# Patient Record
Sex: MALE | Race: WHITE | URBAN - METROPOLITAN AREA
[De-identification: names, ages, dates, MRNs, and addresses within clinical notes are randomized per-mention and may not be internally consistent; named-entity substitution may affect disease eponyms.]

---

## 2017-03-14 ENCOUNTER — OFFICE VISIT (OUTPATIENT)
Dept: CARDIOLOGY CLINIC | Age: 52
End: 2017-03-14

## 2017-03-14 VITALS
HEIGHT: 73 IN | SYSTOLIC BLOOD PRESSURE: 140 MMHG | WEIGHT: 183 LBS | DIASTOLIC BLOOD PRESSURE: 80 MMHG | OXYGEN SATURATION: 98 % | HEART RATE: 71 BPM | BODY MASS INDEX: 24.25 KG/M2 | RESPIRATION RATE: 16 BRPM

## 2017-03-14 DIAGNOSIS — Z82.49 FAMILY HISTORY OF EARLY CAD: ICD-10-CM

## 2017-03-14 DIAGNOSIS — Z00.00 ROUTINE HEALTH MAINTENANCE: ICD-10-CM

## 2017-03-14 DIAGNOSIS — R07.9 EXERTIONAL CHEST PAIN: Primary | ICD-10-CM

## 2017-03-14 DIAGNOSIS — R06.02 SOB (SHORTNESS OF BREATH) ON EXERTION: ICD-10-CM

## 2017-03-14 NOTE — PROGRESS NOTES
Tamy Kirkland     1965       Stephane Roque MD, [facility]  Date of Visit-3/14/2017   PCP is No primary care provider on file. [facility] [place]  [facility]  HPI:  Tamy Kirkland is a 45 y.o. male   Patient is self referred for cardiology evaluation, has family history. He sees no provider regularly. He says that about a year ago he started to develop a chest pain if he sprints 100 yards. If he walks, goes to the gym or does normal activity it is not there, but if he does the heavy exertion then he does get pain in the chest.  He denies otherwise feeling bad, but he is anxious and concerned. He has a bump on his neck which he says is a sebaceous cyst he's had for 20 years. Social History:  Works as an analyst and part time . Is a nonsmoker. . Family History:  Father had an MI at 46,  of MI at 78, brother had MI at 46, had a bypass at 58. ROS:  He declined to fill out the new patient worksheet, including ROS, but it is reviewed with him. EK17 - showed sinus rhythm, within normal limits. Assessment/Plan:     1. Patient with cardiovascular risk factors and exertional chest pain, possible dyspnea on exertion. Will plan a stress test to try to maximize him at peak exercise to see if he gets this repetitive exertional pain and will get a coronary calcium score along with fasting lipids. He has no PCP. Will get a BMP, CBC and TSH. Follow up results of testing. See back in one year if testing is normal.   Impression:   1. Exertional chest pain    2. SOB (shortness of breath) on exertion    3. Routine health maintenance    4. Family history of early CAD       Exam and Labs:    Visit Vitals    /80 (BP 1 Location: Left arm, BP Patient Position: Sitting)    Pulse 71    Resp 16    Ht 6' 1\" (1.854 m)    Wt 183 lb (83 kg)    SpO2 98%    BMI 24.14 kg/m2      Constitutional:  NAD, comfortable  Head: NC,AT.  Eyes: No scleral icterus. Neck:  Neck supple. No JVD present. Throat: moist mucous membranes. Chest: Effort normal & normal respiratory excursion . Neurological: alert, conversant and oriented . Skin: Skin is not cold. No obvious systemic rash noted. Not diaphoretic. No erythema. Psychiatric:  Grossly normal mood and affect. Behavior appears normal. Extremities:  no clubbing or cyanosis. Abdomen: non distended    Lungs:breath sounds normal. No stridor. distress, wheezes or  Rales. Heart:    normal rate, regular rhythm, normal S1, S2, no murmurs, rubs, clicks or gallops , PMI non displaced. Edema: Edema is none. No current outpatient prescriptions on file. No current facility-administered medications for this visit. Impression see above.

## 2017-03-14 NOTE — PATIENT INSTRUCTIONS
Have labs drawn today    Obtain a coronary artery calcium score    Schedule an exercise nuclear stress test    We will call you with all results    Follow up with Dr. Rashad Galeano in 1 year

## 2017-03-14 NOTE — MR AVS SNAPSHOT
Visit Information Date & Time Provider Department Dept. Phone Encounter #  
 3/14/2017 10:00 AM Romana Drew, MD [facility] [phone] [id number] Allergies as of 3/14/2017  Review Complete On: 3/14/2017 By: Marsha Nur No Known Allergies Current Immunizations  Never Reviewed No immunizations on file. Not reviewed this visit You Were Diagnosed With   
  
 Codes Comments SOB (shortness of breath) on exertion    -  Primary ICD-10-CM: R06.02 
ICD-9-CM: 786.05   
 GAUTAM (dyspnea on exertion)     ICD-10-CM: R06.09 
ICD-9-CM: 786.09 Other chest pain     ICD-10-CM: R07.89 ICD-9-CM: 786.59 Routine health maintenance     ICD-10-CM: Z00.00 ICD-9-CM: V70.0 Vitals BP Pulse Resp Height(growth percentile) Weight(growth percentile) SpO2  
 140/80 (BP 1 Location: Left arm, BP Patient Position: Sitting) 71 16 6' 1\" (1.854 m) 183 lb (83 kg) 98% BMI Smoking Status 24.14 kg/m2 Former Smoker Vitals History BMI and BSA Data Body Mass Index Body Surface Area  
 24.14 kg/m 2 2.07 m 2 Your Updated Medication List  
  
Notice  As of 3/14/2017 10:52 AM  
 You have not been prescribed any medications. We Performed the Following AMB POC EKG ROUTINE W/ 12 LEADS, INTER & REP [66913 CPT(R)] CBC W/O DIFF [66699 CPT(R)] LIPID PANEL [59293 CPT(R)] METABOLIC PANEL, BASIC [94839 CPT(R)] TSH 3RD GENERATION [40565 CPT(R)] To-Do List   
 03/14/2017 ECG:  STRESS TEST CARDIOLITE Patient Instructions Have labs drawn today Obtain a coronary artery calcium score Schedule an exercise nuclear stress test 
 
We will call you with all results Follow up with Dr. Kathy Hill in 1 year Introducing [facility]!    
 Kim Alba introduces [employer] patient portal. Now you can access parts of your medical record, email your doctor's office, and request medication refills online. 1. In your internet browser, go to [email or web address] 2. Click on the First Time User? Click Here link in the Sign In box. You will see the New Member Sign Up page. 3. Enter your Z[employer]ora Access Code exactly as it appears below. You will not need to use this code after youve completed the sign-up process. If you do not sign up before the expiration date, you must request a new code. · [employer] Access Code: [email or web address] Expires: 6/12/2017 10:15 AM 
 
4. Enter the last four digits of your Social Security Number (xxxx) and Date of Birth (mm/dd/yyyy) as indicated and click Submit. You will be taken to the next sign-up page. 5. Create a Zuora ID. This will be your Zuora login ID and cannot be changed, so think of one that is secure and easy to remember. 6. Create a Zuora password. You can change your password at any time. 7. Enter your Password Reset Question and Answer. This can be used at a later time if you forget your password. 8. Enter your e-mail address. You will receive e-mail notification when new information is available in [address] 9. Click Sign Up. You can now view and download portions of your medical record. 10. Click the Download Summary menu link to download a portable copy of your medical information. If you have questions, please visit the Frequently Asked Questions section of the [employer] website. Remember, [employer] is NOT to be used for urgent needs. For medical emergencies, dial 911. Now available from your iPhone and Android! Please provide this summary of care documentation to your next provider. If you have any questions after today's visit, please call [phone].

## 2017-03-15 ENCOUNTER — TELEPHONE (OUTPATIENT)
Dept: CARDIOLOGY CLINIC | Age: 52
End: 2017-03-15

## 2017-03-15 LAB
BUN SERPL-MCNC: 14 MG/DL (ref 6–24)
BUN/CREAT SERPL: 16 (ref 9–20)
CALCIUM SERPL-MCNC: 9.4 MG/DL (ref 8.7–10.2)
CHLORIDE SERPL-SCNC: 99 MMOL/L (ref 96–106)
CHOLEST SERPL-MCNC: 174 MG/DL (ref 100–199)
CO2 SERPL-SCNC: 26 MMOL/L (ref 18–29)
CREAT SERPL-MCNC: 0.86 MG/DL (ref 0.76–1.27)
ERYTHROCYTE [DISTWIDTH] IN BLOOD BY AUTOMATED COUNT: 13.4 % (ref 12.3–15.4)
GLUCOSE SERPL-MCNC: 83 MG/DL (ref 65–99)
HCT VFR BLD AUTO: 44 % (ref 37.5–51)
HDLC SERPL-MCNC: 62 MG/DL
HGB BLD-MCNC: 15.1 G/DL (ref 12.6–17.7)
INTERPRETATION, 910389: NORMAL
LDLC SERPL CALC-MCNC: 99 MG/DL (ref 0–99)
MCH RBC QN AUTO: 29.4 PG (ref 26.6–33)
MCHC RBC AUTO-ENTMCNC: 34.3 G/DL (ref 31.5–35.7)
MCV RBC AUTO: 86 FL (ref 79–97)
PLATELET # BLD AUTO: 213 X10E3/UL (ref 150–379)
POTASSIUM SERPL-SCNC: 4.4 MMOL/L (ref 3.5–5.2)
RBC # BLD AUTO: 5.13 X10E6/UL (ref 4.14–5.8)
SODIUM SERPL-SCNC: 142 MMOL/L (ref 134–144)
TRIGL SERPL-MCNC: 65 MG/DL (ref 0–149)
TSH SERPL DL<=0.005 MIU/L-ACNC: 1.22 UIU/ML (ref 0.45–4.5)
VLDLC SERPL CALC-MCNC: 13 MG/DL (ref 5–40)
WBC # BLD AUTO: 6.3 X10E3/UL (ref 3.4–10.8)

## 2017-03-15 NOTE — PROGRESS NOTES
Labs look good  Cholesterol is fine  Let him know  Keep same plans  3/24/2017  9:00 AM    KING ROONEY  3/20/2018  9:40 AM    Guera Golden MD        [place] [id number]

## 2017-03-15 NOTE — TELEPHONE ENCOUNTER
----- Message from Jc Robertson MD sent at 3/15/2017 11:22 AM EDT -----  Labs look good  Cholesterol is fine  Let him know  Keep same plans  3/24/2017  9:00 AM    NEVIN, KING RAND  3/20/2018  9:40 AM    Jc Robertson MD        [place] [address]

## 2017-03-15 NOTE — TELEPHONE ENCOUNTER
Patient identified times 2. Informed of results and recommendations as listed. Understanding verbalized.

## 2017-03-24 ENCOUNTER — TELEPHONE (OUTPATIENT)
Dept: CARDIOLOGY CLINIC | Age: 52
End: 2017-03-24

## 2017-03-24 ENCOUNTER — CLINICAL SUPPORT (OUTPATIENT)
Dept: CARDIOLOGY CLINIC | Age: 52
End: 2017-03-24

## 2017-03-24 DIAGNOSIS — Z82.49 FAMILY HISTORY OF MI (MYOCARDIAL INFARCTION): ICD-10-CM

## 2017-03-24 DIAGNOSIS — Z82.49 FAMILY HISTORY OF EARLY CAD: ICD-10-CM

## 2017-03-24 DIAGNOSIS — R07.9 EXERTIONAL CHEST PAIN: ICD-10-CM

## 2017-03-24 DIAGNOSIS — R07.9 CHEST PAIN, UNSPECIFIED TYPE: ICD-10-CM

## 2017-03-24 DIAGNOSIS — R06.02 SOB (SHORTNESS OF BREATH) ON EXERTION: ICD-10-CM

## 2017-03-24 NOTE — PROGRESS NOTES
See scanned document.       Ordering Doctor:Dr. Britney Ontiveros  Reading Doctor:Dr. Britney Ontiveros

## 2017-03-27 NOTE — TELEPHONE ENCOUNTER
Labs and stress test all normal  Stress test did well on time WNL EF and blood flow is WNL  Future Appointments  Date Time Provider Department Center   3/20/2018 9:40 AM Elizabeth Snyder  [address]    keep fu  Up to him if he wants [facility]

## 2018-03-20 ENCOUNTER — OFFICE VISIT (OUTPATIENT)
Dept: CARDIOLOGY CLINIC | Age: 53
End: 2018-03-20

## 2018-03-20 VITALS
HEIGHT: 73 IN | BODY MASS INDEX: 23.51 KG/M2 | SYSTOLIC BLOOD PRESSURE: 120 MMHG | HEART RATE: 59 BPM | RESPIRATION RATE: 12 BRPM | OXYGEN SATURATION: 98 % | DIASTOLIC BLOOD PRESSURE: 86 MMHG | WEIGHT: 177.4 LBS

## 2018-03-20 DIAGNOSIS — Z82.49 FAMILY HISTORY OF EARLY CAD: Primary | ICD-10-CM

## 2018-03-20 RX ORDER — IBUPROFEN 200 MG
600 TABLET ORAL
COMMUNITY

## 2018-03-20 NOTE — PATIENT INSTRUCTIONS
You will need to follow up in clinic with Dr. Chidi Prince in 1 year. Please get blood work complete today. Dr. Chidi Prince is recommending a coronary Ca+ Score. Information will be given for you to call and set this up.

## 2018-03-20 NOTE — PROGRESS NOTES
Chief Complaint   Patient presents with    Other     yearly follow up. Denies chest pain/dizziness/swelling. Shortness of breath with exertion.

## 2018-03-20 NOTE — MR AVS SNAPSHOT
[address] 
[phone] Patient: Kaleigh Traylor MRN: [id number] :1965 Visit Information Date & Time Provider Department Dept. Phone Encounter #  
 3/20/2018  9:40 AM Alexis John MD [facility] [phone] [id number] Upcoming Health Maintenance Date Due Hepatitis C Screening 1965 DTaP/Tdap/Td series (1 - Tdap) 1985 FOBT Q 1 YEAR AGE 50-75 2014 Influenza Age 5 to Adult 2017 Allergies as of 3/20/2018  Review Complete On: 3/20/2018 By: Albina Gomez RN No Known Allergies Current Immunizations  Never Reviewed No immunizations on file. Not reviewed this visit You Were Diagnosed With   
  
 Codes Comments Shortness of breath    -  Primary ICD-10-CM: R06.02 
ICD-9-CM: 786.05 Dyslipidemia     ICD-10-CM: E78.5 ICD-9-CM: 272.4 Vitals BP Pulse Resp Height(growth percentile) Weight(growth percentile) SpO2  
 120/86 (BP 1 Location: Right arm, BP Patient Position: Sitting) (!) 59 12 6' 1\" (1.854 m) 177 lb 6.4 oz (80.5 kg) 98% BMI Smoking Status 23.41 kg/m2 Former Smoker BMI and BSA Data Body Mass Index Body Surface Area  
 23.41 kg/m 2 2.04 m 2 Preferred Pharmacy Pharmacy Name Phone [address] [facility] [address] [place] [address]. [phone] Your Updated Medication List  
  
   
This list is accurate as of 3/20/18 10:35 AM.  Always use your most recent med list.  
  
  
  
  
 ibuprofen 200 mg tablet Commonly known as:  MOTRIN Take 600 mg by mouth every six (6) hours as needed for Pain. We Performed the Following AMB POC EKG ROUTINE W/ 12 LEADS, INTER & REP [76179 CPT(R)] LIPID PANEL [90771 CPT(R)] Patient Instructions You will need to follow up in clinic with Dr. Angelica Thompson in 1 year. Please get blood work complete today. Dr. Angelica Thompson is recommending a coronary Ca+ Score. Information will be given for you to call and set this up. Introducing [facility]! Ferdinand Du introduces [employer] patient portal. Now you can access parts of your medical record, email your doctor's office, and request medication refills online. 1. In your internet browser, go to [email or web address] 2. Click on the First Time User? Click Here link in the Sign In box. You will see the New Member Sign Up page. 3. Enter your [employer] Access Code exactly as it appears below. You will not need to use this code after youve completed the sign-up process. If you do not sign up before the expiration date, you must request a new code. · [employer] Access Code: [email or web address] Expires: 6/4/2018  3:47 PM 
 
4. Enter the last four digits of your Social Security Number (xxxx) and Date of Birth (mm/dd/yyyy) as indicated and click Submit. You will be taken to the next sign-up page. 5. Create a [employer] ID. This will be your [employer] login ID and cannot be changed, so think of one that is secure and easy to remember. 6. Create a [employer] password. You can change your password at any time. 7. Enter your Password Reset Question and Answer. This can be used at a later time if you forget your password. 8. Enter your e-mail address. You will receive e-mail notification when new information is available in [address] 9. Click Sign Up. You can now view and download portions of your medical record. 10. Click the Download Summary menu link to download a portable copy of your medical information. If you have questions, please visit the Frequently Asked Questions section of the [employer] website. Remember, [employer] is NOT to be used for urgent needs. For medical emergencies, dial 911. Now available from your iPhone and Android! Please provide this summary of care documentation to your next provider. If you have any questions after today's visit, please call [phone].

## 2018-03-20 NOTE — PROGRESS NOTES
Savannah Quach     1965       Stephane Almazan MD, [facility] [place]  Date of Visit-3/20/2018   PCP is No primary care provider on file. [facility] [place]  [facility]  HPI:  Savannah Quach is a 47 y.o. male   Patient was seen one year ago and  self referred for cardiology evaluation, has family history. He sees no provider regularly. He had chest pain and underwent labs and stress test all were normal  His LDL was 99,  He has a bump on his neck which he says is a sebaceous cyst he's had for 20 years. Today returns, active, Denies chest pain, edema, syncope or shortness of breath at rest   Has no tachycardia , palpitations or sense of arrythmia    Family History:  Father had an MI at 46,  of MI at 78, brother had MI at 46, had a bypass at 58. Assessment/Plan:     Patient with cardiovascular risk factors   ---resolved chest pain, normal stress test   LDL 99 intermediate , will repeat lipids and get Coronary Calcium heart CT scan  If high then statin, if similar LDL and low Agatson score then no changes  Fu one year  Call with test results   Impression:   1. 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 . Exam and Labs:    Visit Vitals    /86 (BP 1 Location: Right arm, BP Patient Position: Sitting)    Pulse (!) 59    Resp 12    Ht 6' 1\" (1.854 m)    Wt 177 lb 6.4 oz (80.5 kg)    SpO2 98%    BMI 23.41 kg/m2      Constitutional:  NAD, comfortable  Head: NC,AT. Eyes: No scleral icterus. Neck:  Neck supple. No JVD present. Throat: moist mucous membranes. Chest: Effort normal & normal respiratory excursion . Neurological: alert, conversant and oriented . Skin: Skin is not cold. No obvious systemic rash noted. Not diaphoretic. No erythema. Psychiatric:  Grossly normal mood and affect. Behavior appears normal. Extremities:  no clubbing or cyanosis.  Abdomen: non distended    Lungs:breath sounds normal. No stridor. distress, wheezes or  Rales. Heart:    normal rate, regular rhythm, normal S1, S2, no murmurs, rubs, clicks or gallops , PMI non displaced. Edema: Edema is none. Current Outpatient Prescriptions   Medication Sig    ibuprofen (MOTRIN) 200 mg tablet Take 600 mg by mouth every six (6) hours as needed for Pain. No current facility-administered medications for this visit. Impression see above.

## 2019-05-07 ENCOUNTER — OFFICE VISIT (OUTPATIENT)
Dept: CARDIOLOGY CLINIC | Age: 54
End: 2019-05-07

## 2019-05-07 VITALS
WEIGHT: 182 LBS | BODY MASS INDEX: 24.12 KG/M2 | RESPIRATION RATE: 16 BRPM | HEART RATE: 60 BPM | HEIGHT: 73 IN | SYSTOLIC BLOOD PRESSURE: 120 MMHG | OXYGEN SATURATION: 99 % | DIASTOLIC BLOOD PRESSURE: 60 MMHG

## 2019-05-07 DIAGNOSIS — Z82.49 FAMILY HISTORY OF EARLY CAD: Primary | ICD-10-CM

## 2019-05-07 NOTE — PROGRESS NOTES
Visit Vitals /60 (BP 1 Location: Left arm, BP Patient Position: Sitting) Pulse 60 Resp 16 Ht 6' 1\" (1.854 m) Wt 182 lb (82.6 kg) SpO2 99% BMI 24.01 kg/m²

## 2019-05-07 NOTE — PROGRESS NOTES
Rashmi Domingo     1965       Stephane Hill MD, [facility] [place]  Date of Visit-5/7/2019   PCP is No primary care provider on file. [facility] [place]  [facility]  HPI:  Rashmi Domingo is a 53 y.o. male   Pt is coming in for annual follow up for cardiac risk factors and family hx of cad. He has had a prior normal stress test. Patient was seen one year ago and  self referred for cardiology evaluation, has family history    Today Mr. Harini Roman reports that he is feeling \"pretty good. \" He denies partaking in consistent exercise. 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 Has some SOB with playing basketball, but this has been the case for a while now. Denies chest pain, edema, syncope or shortness of breath at rest, has no tachycardia, palpitations or sense of arrhythmia. EKG: sinus with an incomplete right bundle branch block    Assessment/Plan:     Cardiovascular risk factors: asymptomatic. Doing well. Has strong family hx. We are going to repeat his lipids and then decide if he needs a coronary calcium score. He was intermediate on his last lipids. F/U in one year  Future Appointments   Date Time Provider Baldomero Marshi   5/11/2020  9:40 AM Bárbara Bradley  [address]      Patient Instructions   Obtain lipid panel today. Brochure for coronary calcium score given to patient. Follow up with  in 1 Year. Key CAD CHF Meds     Patient is on no cardiovascular meds. Impression:   1. Family history of early CAD       Cardiac History:   No specialty comments available. ROS-except as noted above. . A complete cardiac and respiratory are reviewed and negative except as above ; Resp-denies wheezing  or productive cough,.  Const- No unusual weight loss or fever; Neuro-no recent seizure or CVA ; GI- No BRBPR, abdom pain, bloating ; - no  hematuria   see supplement sheet, initialed and to be scanned by staff  Past Medical History:   Diagnosis Date    Family history of early CAD 3/20/2018      Social Hx= reports that he has quit smoking. He has never used smokeless tobacco. He reports that he drinks alcohol. He reports that he does not use drugs. Exam and Labs:  /60 (BP 1 Location: Left arm, BP Patient Position: Sitting)   Pulse 60   Resp 16   Ht 6' 1\" (1.854 m)   Wt 182 lb (82.6 kg)   SpO2 99%   BMI 24.01 kg/m² Constitutional:  NAD, comfortable  Head: NC,AT. Eyes: No scleral icterus. Neck:  Neck supple. No JVD present. Throat: moist mucous membranes. Chest: Effort normal & normal respiratory excursion . Neurological: alert, conversant and oriented . Skin: Skin is not cold. No obvious systemic rash noted. Not diaphoretic. No erythema. Psychiatric:  Grossly normal mood and affect. Behavior appears normal. Extremities:  no clubbing or cyanosis. Abdomen: non distended    Lungs:breath sounds normal. No stridor. distress, wheezes or  Rales. Heart: normal rate, regular rhythm, normal S1, S2, no murmurs, rubs, clicks or gallops , PMI non displaced. Edema: Edema is none.   Lab Results   Component Value Date/Time    Cholesterol, total 174 03/14/2017 11:12 AM    HDL Cholesterol 62 03/14/2017 11:12 AM    LDL, calculated 99 03/14/2017 11:12 AM    Triglyceride 65 03/14/2017 11:12 AM     Lab Results   Component Value Date/Time    Sodium 142 03/14/2017 11:12 AM    Potassium 4.4 03/14/2017 11:12 AM    Chloride 99 03/14/2017 11:12 AM    CO2 26 03/14/2017 11:12 AM    Glucose 83 03/14/2017 11:12 AM    BUN 14 03/14/2017 11:12 AM    Creatinine 0.86 03/14/2017 11:12 AM    BUN/Creatinine ratio 16 03/14/2017 11:12 AM    GFR est  03/14/2017 11:12 AM    GFR est non- 03/14/2017 11:12 AM    Calcium 9.4 03/14/2017 11:12 AM      Wt Readings from Last 3 Encounters:   05/07/19 182 lb (82.6 kg)   03/20/18 177 lb 6.4 oz (80.5 kg)   03/14/17 183 lb (83 kg)      BP Readings from Last 3 Encounters:   05/07/19 120/60   03/20/18 120/86   03/14/17 140/80      Current Outpatient Medications   Medication Sig    ibuprofen (MOTRIN) 200 mg tablet Take 600 mg by mouth every six (6) hours as needed for Pain. No current facility-administered medications for this visit. Impression see above.       Written by Court Lozano, as dictated by Jonathan Mart MD.

## 2019-05-07 NOTE — PATIENT INSTRUCTIONS
Obtain lipid panel today. Brochure for coronary calcium score given to patient. Follow up with  in 1 Year.

## 2019-05-09 ENCOUNTER — TELEPHONE (OUTPATIENT)
Dept: CARDIOLOGY CLINIC | Age: 54
End: 2019-05-09

## 2019-05-09 LAB
CHOLEST SERPL-MCNC: 184 MG/DL (ref 100–199)
HDLC SERPL-MCNC: 53 MG/DL
INTERPRETATION, 910389: NORMAL
LDLC SERPL CALC-MCNC: 116 MG/DL (ref 0–99)
TRIGL SERPL-MCNC: 73 MG/DL (ref 0–149)
VLDLC SERPL CALC-MCNC: 15 MG/DL (ref 5–40)

## 2019-05-09 NOTE — TELEPHONE ENCOUNTER
----- Message from Robert Morfin MD sent at 5/9/2019 11:04 AM EDT -----  Again with an intermediate LDL   Not high enough for meds, but now low either  Suggest he get the Coronary Calcium score as we planned and if score low then no statin, if elevated then statin

## 2019-05-09 NOTE — TELEPHONE ENCOUNTER
Identifiers x 2. Informed patient of Dr. Bharti Garcia recommendation to get coronary calcium score. Verbalized understanding.

## 2019-05-09 NOTE — PROGRESS NOTES
Again with an intermediate LDL   Not high enough for meds, but now low either  Suggest he get the Coronary Calcium score as we planned and if score low then no statin, if elevated then statin

## 2022-03-19 PROBLEM — Z82.49 FAMILY HISTORY OF EARLY CAD: Status: ACTIVE | Noted: 2018-03-20

## 2022-11-11 ENCOUNTER — TELEPHONE (OUTPATIENT)
Dept: CARDIOLOGY CLINIC | Age: 57
End: 2022-11-11

## 2022-11-11 DIAGNOSIS — Z82.49 FAMILY HISTORY OF EARLY CAD: Primary | ICD-10-CM

## 2022-11-11 NOTE — TELEPHONE ENCOUNTER
Pt has not been scheduled with  since 2020. Pt would like to schedule an appointment, but would like to know If he can get a calcium scoring before his appointment. Pt would like to speak with 's  Nelly SOTO      Pt #    [phone]

## 2022-11-15 NOTE — TELEPHONE ENCOUNTER
Verified patient with two types of identifiers. Advised patient CCS is $130 out of pocket and he should call [phone] to schedule it. Scheduled follow up as well.      Future Appointments   Date Time Provider Baldomero Whaley   12/19/2022  3:20 PM MD MARKIE Sena AMB

## 2022-11-30 ENCOUNTER — HOSPITAL ENCOUNTER (OUTPATIENT)
Dept: CT IMAGING | Age: 57
Discharge: HOME | End: 2022-11-30
Attending: SPECIALIST
Payer: SELF-PAY

## 2022-11-30 DIAGNOSIS — Z82.49 FAMILY HISTORY OF EARLY CAD: ICD-10-CM

## 2022-11-30 PROCEDURE — 75571 CT HRT W/O DYE W/CA TEST: CPT

## 2022-12-20 ENCOUNTER — OFFICE VISIT (OUTPATIENT)
Dept: CARDIOLOGY CLINIC | Age: 57
End: 2022-12-20
Payer: COMMERCIAL

## 2022-12-20 VITALS
HEART RATE: 81 BPM | RESPIRATION RATE: 16 BRPM | WEIGHT: 183 LBS | HEIGHT: 73 IN | OXYGEN SATURATION: 99 % | BODY MASS INDEX: 24.25 KG/M2 | SYSTOLIC BLOOD PRESSURE: 130 MMHG | DIASTOLIC BLOOD PRESSURE: 80 MMHG

## 2022-12-20 DIAGNOSIS — I25.10 CORONARY ARTERY ATHEROMA: Primary | ICD-10-CM

## 2022-12-20 DIAGNOSIS — Z82.49 FAMILY HISTORY OF EARLY CAD: ICD-10-CM

## 2022-12-20 DIAGNOSIS — R93.1 AGATSTON CAC SCORE, >400: ICD-10-CM

## 2022-12-20 PROCEDURE — 99204 OFFICE O/P NEW MOD 45 MIN: CPT | Performed by: SPECIALIST

## 2022-12-20 PROCEDURE — 93000 ELECTROCARDIOGRAM COMPLETE: CPT | Performed by: SPECIALIST

## 2022-12-20 RX ORDER — ATORVASTATIN CALCIUM 20 MG/1
20 TABLET, FILM COATED ORAL
Qty: 90 TABLET | Refills: 3 | Status: SHIPPED | OUTPATIENT
Start: 2022-12-20

## 2022-12-20 RX ORDER — GUAIFENESIN 100 MG/5ML
81 LIQUID (ML) ORAL DAILY
Qty: 90 TABLET | Refills: 3
Start: 2022-12-20

## 2022-12-20 NOTE — PATIENT INSTRUCTIONS
Start baby Aspirin 81mg daily OTC and Lipitor 20mg daily. You have been scheduled for an exercise nuclear stress test and an echo. We will see you back after testing. Please arrive 15 minutes prior to your appointment. Wear comfortable clothing and walking or athletic shoes. Do not eat or drink anything, except water, for at least 4 hours prior to your appointment. Avoid tobacco products for at least 12 hours prior to your test.    Do not eat or drink anything containing caffeine, including but not limited to the following: chocolate, regular and decaffeinated coffee, soft drinks, or tea for at least 24 hours prior to your test.    Do not hold your scheduled medications prior to your test.    Your test will be performed on a 1 day protocol. This is determined by your height, weight, and other risk factors. For a 1 day test, please allow for 4 hours in the office that day.

## 2022-12-20 NOTE — PROGRESS NOTES
Cornel Samuels     1965       Stephane Hayes MD, [facility]  Date of Visit-12/20/2022   PCP is None   [facility] [place]  [facility]  HPI:  Cornel Samuels is a 61 y.o. male   Last seen 5/2019 for self referred for cardiology evaluation, has family history  cardiac risk factors and family hx of cad. He has had a prior normal stress test.     Today   CT cors with coronary Ca score at 659 at 90th percentile  EKG: sinus with an incomplete right bundle branch block  On exertion but it has to be at 5 flights stairs. He has no edema or chest pain. He gets occasional numbness in his extremities including left side. Assessment/Plan:     The primary encounter diagnosis was Coronary artery atheroma. Diagnoses of Agatston CAC score, >400 and Family history of early CAD were also pertinent to this visit. Markedly elevated coronary calcium score. Despite prior normal lipids given the score and his family history strong suggested high potency statin and get at least noninvasive testing. Schedule for exercise nuclear stress. Goal LDL <70-85    Patient Instructions   Start baby Aspirin 81mg daily OTC and Lipitor 20mg daily. You have been scheduled for an exercise nuclear stress test and an echo. We will see you back after testing. Please arrive 15 minutes prior to your appointment. Wear comfortable clothing and walking or athletic shoes. Do not eat or drink anything, except water, for at least 4 hours prior to your appointment. Avoid tobacco products for at least 12 hours prior to your test.    Do not eat or drink anything containing caffeine, including but not limited to the following: chocolate, regular and decaffeinated coffee, soft drinks, or tea for at least 24 hours prior to your test.    Do not hold your scheduled medications prior to your test.    Your test will be performed on a 1 day protocol.  This is determined by your height, weight, and other risk factors. For a 1 day test, please allow for 4 hours in the office that day. Impression:   1. Coronary artery atheroma    2. Agatston CAC score, >400    3. Family history of early CAD       Cardiac History:   No specialty comments available. ROS-except as noted above. . A complete cardiac and respiratory are reviewed and negative except as above ; Resp-denies wheezing  or productive cough,. Const- No unusual weight loss or fever; Neuro-no recent seizure or CVA ; GI- No BRBPR, abdom pain, bloating ; - no  hematuria   see supplement sheet, initialed and to be scanned by staff  Past Medical History:   Diagnosis Date    Family history of early CAD 3/20/2018      Social Hx= reports that he has quit smoking. He has never used smokeless tobacco. He reports current alcohol use. He reports that he does not use drugs. Exam and Labs:  /80   Pulse 81   Resp 16   Ht 6' 1\" (1.854 m)   Wt 183 lb (83 kg)   SpO2 99%   BMI 24.14 kg/m² Constitutional:  NAD, comfortable  Head: NC,AT. Eyes: No scleral icterus. Neck:  Neck supple. No JVD present. Throat: moist mucous membranes. Chest: Effort normal & normal respiratory excursion . Neurological: alert, conversant and oriented . Skin: Skin is not cold. No obvious systemic rash noted. Not diaphoretic. No erythema. Psychiatric:  Grossly normal mood and affect. Behavior appears normal. Extremities:  no clubbing or cyanosis. Abdomen: non distended    Lungs:breath sounds normal. No stridor. distress, wheezes or  Rales. Heart: normal rate, regular rhythm, normal S1, S2, no murmurs, rubs, clicks or gallops , PMI non displaced. Edema: Edema is none.   Lab Results   Component Value Date/Time    Cholesterol, total 184 05/08/2019 10:22 AM    HDL Cholesterol 53 05/08/2019 10:22 AM    LDL, calculated 116 (H) 05/08/2019 10:22 AM    Triglyceride 73 05/08/2019 10:22 AM     Lab Results   Component Value Date/Time    Sodium 142 03/14/2017 11:12 AM    Potassium 4.4 03/14/2017 11:12 AM    Chloride 99 03/14/2017 11:12 AM    CO2 26 03/14/2017 11:12 AM    Glucose 83 03/14/2017 11:12 AM    BUN 14 03/14/2017 11:12 AM    Creatinine 0.86 03/14/2017 11:12 AM    BUN/Creatinine ratio 16 03/14/2017 11:12 AM    GFR est  03/14/2017 11:12 AM    GFR est non- 03/14/2017 11:12 AM    Calcium 9.4 03/14/2017 11:12 AM      Wt Readings from Last 3 Encounters:   12/20/22 183 lb (83 kg)   05/07/19 182 lb (82.6 kg)   03/20/18 177 lb 6.4 oz (80.5 kg)      BP Readings from Last 3 Encounters:   12/20/22 130/80   05/07/19 120/60   03/20/18 120/86      Current Outpatient Medications   Medication Sig    ibuprofen (MOTRIN) 200 mg tablet Take 600 mg by mouth every six (6) hours as needed for Pain. (Patient not taking: Reported on 12/20/2022)     No current facility-administered medications for this visit. Impression see above. This note was created using voice recognition software. Despite editing, there may be syntax errors.

## 2023-02-07 ENCOUNTER — ANCILLARY PROCEDURE (OUTPATIENT)
Dept: CARDIOLOGY CLINIC | Age: 58
End: 2023-02-07

## 2023-02-07 VITALS
DIASTOLIC BLOOD PRESSURE: 80 MMHG | WEIGHT: 183 LBS | SYSTOLIC BLOOD PRESSURE: 142 MMHG | BODY MASS INDEX: 24.25 KG/M2 | HEIGHT: 73 IN

## 2023-02-07 DIAGNOSIS — I25.10 CORONARY ARTERY ATHEROMA: ICD-10-CM

## 2023-02-07 DIAGNOSIS — R93.1 AGATSTON CAC SCORE, >400: ICD-10-CM

## 2023-02-07 DIAGNOSIS — Z82.49 FAMILY HISTORY OF EARLY CAD: ICD-10-CM

## 2023-02-07 LAB
ECHO AO ASC DIAM: 3.5 CM
ECHO AO ASCENDING AORTA INDEX: 1.69 CM/M2
ECHO AO ROOT DIAM: 3.9 CM
ECHO AO ROOT INDEX: 1.88 CM/M2
ECHO AV AREA PEAK VELOCITY: 3.9 CM2
ECHO AV AREA VTI: 4.4 CM2
ECHO AV AREA/BSA PEAK VELOCITY: 1.9 CM2/M2
ECHO AV AREA/BSA VTI: 2.1 CM2/M2
ECHO AV MEAN GRADIENT: 2 MMHG
ECHO AV MEAN VELOCITY: 0.8 M/S
ECHO AV PEAK GRADIENT: 4 MMHG
ECHO AV PEAK VELOCITY: 1 M/S
ECHO AV VELOCITY RATIO: 0.9
ECHO AV VTI: 17.7 CM
ECHO LA DIAMETER INDEX: 1.88 CM/M2
ECHO LA DIAMETER: 3.9 CM
ECHO LA TO AORTIC ROOT RATIO: 1
ECHO LA VOL 2C: 55 ML (ref 18–58)
ECHO LA VOL 4C: 47 ML (ref 18–58)
ECHO LA VOL BP: 52 ML (ref 18–58)
ECHO LA VOL/BSA BIPLANE: 25 ML/M2 (ref 16–34)
ECHO LA VOLUME AREA LENGTH: 55 ML
ECHO LA VOLUME INDEX A2C: 27 ML/M2 (ref 16–34)
ECHO LA VOLUME INDEX A4C: 23 ML/M2 (ref 16–34)
ECHO LA VOLUME INDEX AREA LENGTH: 27 ML/M2 (ref 16–34)
ECHO LV E' LATERAL VELOCITY: 8 CM/S
ECHO LV E' SEPTAL VELOCITY: 7 CM/S
ECHO LV EDV A2C: 50 ML
ECHO LV EDV A4C: 94 ML
ECHO LV EDV BP: 73 ML (ref 67–155)
ECHO LV EDV INDEX A4C: 45 ML/M2
ECHO LV EDV INDEX BP: 35 ML/M2
ECHO LV EDV NDEX A2C: 24 ML/M2
ECHO LV EJECTION FRACTION A2C: 43 %
ECHO LV EJECTION FRACTION A4C: 65 %
ECHO LV EJECTION FRACTION BIPLANE: 56 % (ref 55–100)
ECHO LV ESV A2C: 28 ML
ECHO LV ESV A4C: 33 ML
ECHO LV ESV BP: 32 ML (ref 22–58)
ECHO LV ESV INDEX A2C: 14 ML/M2
ECHO LV ESV INDEX A4C: 16 ML/M2
ECHO LV ESV INDEX BP: 15 ML/M2
ECHO LV FRACTIONAL SHORTENING: 35 % (ref 28–44)
ECHO LV INTERNAL DIMENSION DIASTOLE INDEX: 1.64 CM/M2
ECHO LV INTERNAL DIMENSION DIASTOLIC: 3.4 CM (ref 4.2–5.9)
ECHO LV INTERNAL DIMENSION SYSTOLIC INDEX: 1.06 CM/M2
ECHO LV INTERNAL DIMENSION SYSTOLIC: 2.2 CM
ECHO LV IVSD: 1.5 CM (ref 0.6–1)
ECHO LV MASS 2D: 166.2 G (ref 88–224)
ECHO LV MASS INDEX 2D: 80.3 G/M2 (ref 49–115)
ECHO LV POSTERIOR WALL DIASTOLIC: 1.3 CM (ref 0.6–1)
ECHO LV RELATIVE WALL THICKNESS RATIO: 0.76
ECHO LVOT AREA: 4.2 CM2
ECHO LVOT AV VTI INDEX: 1.1
ECHO LVOT DIAM: 2.3 CM
ECHO LVOT MEAN GRADIENT: 2 MMHG
ECHO LVOT PEAK GRADIENT: 4 MMHG
ECHO LVOT PEAK VELOCITY: 0.9 M/S
ECHO LVOT STROKE VOLUME INDEX: 39.1 ML/M2
ECHO LVOT SV: 81 ML
ECHO LVOT VTI: 19.5 CM
ECHO MV A VELOCITY: 0.72 M/S
ECHO MV E DECELERATION TIME (DT): 159.6 MS
ECHO MV E VELOCITY: 0.6 M/S
ECHO MV E/A RATIO: 0.83
ECHO MV E/E' LATERAL: 7.5
ECHO MV E/E' RATIO (AVERAGED): 8.04
ECHO MV E/E' SEPTAL: 8.57
ECHO PV MAX VELOCITY: 1 M/S
ECHO PV PEAK GRADIENT: 4 MMHG
ECHO RA MINOR AXIS INDEX: 1.64 CM/M2
ECHO RA MINOR AXIS: 3.4 CM
ECHO RV INTERNAL DIMENSION: 4 CM
ECHO RV TAPSE: 1.7 CM (ref 1.7–?)
ECHO TV REGURGITANT MAX VELOCITY: 2.41 M/S
ECHO TV REGURGITANT PEAK GRADIENT: 23 MMHG
NUC STRESS EJECTION FRACTION: 71 %
STRESS ANGINA INDEX: 0
STRESS BASELINE DIAS BP: 80 MMHG
STRESS BASELINE HR: 74 BPM
STRESS BASELINE SYS BP: 142 MMHG
STRESS ESTIMATED WORKLOAD: 10.1 METS
STRESS EXERCISE DUR MIN: 8 MIN
STRESS EXERCISE DUR SEC: 5 SEC
STRESS O2 SAT PEAK: 98 %
STRESS O2 SAT REST: 99 %
STRESS PEAK DIAS BP: 82 MMHG
STRESS PEAK SYS BP: 166 MMHG
STRESS PERCENT HR ACHIEVED: 93 %
STRESS POST PEAK HR: 150 BPM
STRESS RATE PRESSURE PRODUCT: NORMAL BPM*MMHG
STRESS TARGET HR: 162 BPM

## 2023-02-07 RX ORDER — TETRAKIS(2-METHOXYISOBUTYLISOCYANIDE)COPPER(I) TETRAFLUOROBORATE 1 MG/ML
30 INJECTION, POWDER, LYOPHILIZED, FOR SOLUTION INTRAVENOUS ONCE
Status: COMPLETED | OUTPATIENT
Start: 2023-02-07 | End: 2023-02-07

## 2023-02-07 RX ORDER — TETRAKIS(2-METHOXYISOBUTYLISOCYANIDE)COPPER(I) TETRAFLUOROBORATE 1 MG/ML
10 INJECTION, POWDER, LYOPHILIZED, FOR SOLUTION INTRAVENOUS ONCE
Status: COMPLETED | OUTPATIENT
Start: 2023-02-07 | End: 2023-02-07

## 2023-02-07 RX ADMIN — TETRAKIS(2-METHOXYISOBUTYLISOCYANIDE)COPPER(I) TETRAFLUOROBORATE 7.6 MILLICURIE: 1 INJECTION, POWDER, LYOPHILIZED, FOR SOLUTION INTRAVENOUS at 13:15

## 2023-02-07 RX ADMIN — TETRAKIS(2-METHOXYISOBUTYLISOCYANIDE)COPPER(I) TETRAFLUOROBORATE 25.2 MILLICURIE: 1 INJECTION, POWDER, LYOPHILIZED, FOR SOLUTION INTRAVENOUS at 14:20

## 2023-02-22 ENCOUNTER — OFFICE VISIT (OUTPATIENT)
Dept: CARDIOLOGY CLINIC | Age: 58
End: 2023-02-22
Payer: COMMERCIAL

## 2023-02-22 VITALS
BODY MASS INDEX: 24.39 KG/M2 | SYSTOLIC BLOOD PRESSURE: 120 MMHG | HEIGHT: 73 IN | HEART RATE: 79 BPM | DIASTOLIC BLOOD PRESSURE: 80 MMHG | WEIGHT: 184 LBS | RESPIRATION RATE: 16 BRPM | OXYGEN SATURATION: 99 %

## 2023-02-22 DIAGNOSIS — Z82.49 FAMILY HISTORY OF EARLY CAD: ICD-10-CM

## 2023-02-22 DIAGNOSIS — R93.1 AGATSTON CAC SCORE, >400: Primary | ICD-10-CM

## 2023-02-22 PROCEDURE — 99213 OFFICE O/P EST LOW 20 MIN: CPT | Performed by: SPECIALIST

## 2023-02-22 NOTE — Clinical Note
2/22/2023    Patient: Adine Snellen   YOB: 1965   Date of Visit: 2/22/2023     Karlene Ventura MD  [address] [address]  Via Fax: [phone]    Dear Karlene Ventura MD,      Thank you for referring Mr. Zhang Handley to [address] [place] [address] for evaluation. My notes for this consultation are attached. If you have questions, please do not hesitate to call me. I look forward to following your patient along with you.       Sincerely,    Kimmie Knutson MD

## 2023-02-22 NOTE — PROGRESS NOTES
Fabian Eason     1965       Stephane Marcum [place], [facility] [place]  Date of Visit-2/22/2023   PCP is Neoma Gilford, [address] [facility] [place]  [facility]  HPI:  Fabian Eason is a 61 y.o. male   2 month follow up of testing  CT cors with coronary Ca score at 659 at 90th percentile  family hx of cad. Seen on 12/20/2022 self-referred for further cardiac evaluation scheduled for a stress test and echo which are reviewed below. Jamie Wheeler returns today doing generally well he has no chest pain shortness of breath or tachycardia. He had 1 episode in the evening of with some leg swelling that resolved. He had Coivd and now recovered. He is here to review his echocardiogram and stress test.  Echo showed normal ejection fraction normal valves very borderline LVH. His nuclear showed no ischemia with a normal ejection fraction and normal rhythm. He is generally feeling well. He has not had lipids checked recently. 02/07/23    ECHO ADULT COMPLETE 02/14/2023 2/14/2023    Interpretation Summary    Left Ventricle: Normal left ventricular systolic function. EF by 2D Simpsons Biplane is 56%. Left ventricle size is normal. Mildly increased wall thickness. Mass index 2D is 80.3 g/m2. Findings consistent with mild concentric hypertrophy. Signed by: Salma Juan MD on 2/14/2023  8:28 PM    02/07/23    NUCLEAR CARDIAC STRESS TEST 02/14/2023 2/16/2023    Interpretation Summary    Nuclear Findings: Normal treadmill myocardial perfusion study at 93 %% PHR. Good exercise time. Nuclear Findings: Normal left ventricular systolic function post-stress. Post-stress ejection fraction is 71%. ECG: Resting ECG demonstrates normal sinus rhythm. ECG: Stress ECG was negative for ischemia. Stress Test: A Miguelangel protocol stress test was performed. Overall, the patient's exercise capacity was average for their age.  The patient reached stage 3 of the protocol And was stressed for 8 min and 5 sec. Hemodynamics are adequate for diagnosis. Blood pressure demonstrated a normal response and heart rate demonstrated a normal response to stress. The patient's heart rate recovery was normal.    Signed by: Natan Thakkar MD on 2/14/2023  7:33 PM  Assessment/Plan:     Patient Instructions   Fasting labs now and one week prior to annual follow up. 1. Agatston CAC score, >400  High calcium score with normal stress nuclear and no chest pain. Suggest repeat lipid profile keep LDL cholesterol below 70 if possible. Continue Lipitor  Echo shows normal LV function  Plan to check lipids now and in 1 year with follow-up. 2. Family history of early CAD      Impression:   1. Agatston CAC score, >400    2. Family history of early CAD         Cardiac History:   No specialty comments available. ROS-except as noted above. . A complete cardiac and respiratory are reviewed and negative except as above ; Resp-denies wheezing  or productive cough,. Const- No unusual weight loss or fever; Neuro-no recent seizure or CVA ; GI- No BRBPR, abdom pain, bloating ; - no  hematuria   see supplement sheet, initialed and to be scanned by staff  Past Medical History:   Diagnosis Date    Family history of early CAD 3/20/2018      Social Hx= reports that he has quit smoking. He has never used smokeless tobacco. He reports current alcohol use. He reports that he does not use drugs. Exam and Labs:  /80   Pulse 79   Resp 16   Ht 6' 1\" (1.854 m)   Wt 184 lb (83.5 kg)   SpO2 99%   BMI 24.28 kg/m² Constitutional:  NAD, comfortable  Head: NC,AT. Eyes: No scleral icterus. Neck:  Neck supple. No JVD present. Throat: moist mucous membranes. Chest: Effort normal & normal respiratory excursion . Neurological: alert, conversant and oriented . Skin: Skin is not cold. No obvious systemic rash noted. Not diaphoretic. No erythema. Psychiatric:  Grossly normal mood and affect.   Behavior appears normal. Extremities:  no clubbing or cyanosis. Abdomen: non distended    Lungs:breath sounds normal. No stridor. distress, wheezes or  Rales. Heart: normal rate, regular rhythm, normal S1, S2, no murmurs, rubs, clicks or gallops , PMI non displaced. Edema: Edema is none. Lab Results   Component Value Date/Time    Cholesterol, total 184 05/08/2019 10:22 AM    HDL Cholesterol 53 05/08/2019 10:22 AM    LDL, calculated 116 (H) 05/08/2019 10:22 AM    Triglyceride 73 05/08/2019 10:22 AM     Lab Results   Component Value Date/Time    Sodium 142 03/14/2017 11:12 AM    Potassium 4.4 03/14/2017 11:12 AM    Chloride 99 03/14/2017 11:12 AM    CO2 26 03/14/2017 11:12 AM    Glucose 83 03/14/2017 11:12 AM    BUN 14 03/14/2017 11:12 AM    Creatinine 0.86 03/14/2017 11:12 AM    BUN/Creatinine ratio 16 03/14/2017 11:12 AM    GFR est  03/14/2017 11:12 AM    GFR est non- 03/14/2017 11:12 AM    Calcium 9.4 03/14/2017 11:12 AM      Wt Readings from Last 3 Encounters:   02/22/23 184 lb (83.5 kg)   02/07/23 183 lb (83 kg)   12/20/22 183 lb (83 kg)      BP Readings from Last 3 Encounters:   02/22/23 120/80   02/07/23 (!) 142/80   12/20/22 130/80      Current Outpatient Medications   Medication Sig    aspirin 81 mg chewable tablet Take 1 Tablet by mouth daily. atorvastatin (LIPITOR) 20 mg tablet Take 1 Tablet by mouth nightly. No current facility-administered medications for this visit. Impression see above. This note was created using voice recognition software. Despite editing, there may be syntax errors.

## 2023-02-23 LAB
ALBUMIN SERPL-MCNC: 4.8 G/DL (ref 3.8–4.9)
ALP SERPL-CCNC: 60 IU/L (ref 44–121)
ALT SERPL-CCNC: 33 IU/L (ref 0–44)
AST SERPL-CCNC: 23 IU/L (ref 0–40)
BASOPHILS # BLD AUTO: NORMAL 10*3/UL
BASOPHILS NFR BLD AUTO: NORMAL %
BILIRUB DIRECT SERPL-MCNC: 0.33 MG/DL (ref 0–0.4)
BILIRUB SERPL-MCNC: 1.9 MG/DL (ref 0–1.2)
CHOLEST SERPL-MCNC: 141 MG/DL (ref 100–199)
EOSINOPHIL # BLD AUTO: NORMAL 10*3/UL
EOSINOPHIL NFR BLD AUTO: NORMAL %
ERYTHROCYTE [DISTWIDTH] IN BLOOD BY AUTOMATED COUNT: NORMAL %
HCT VFR BLD AUTO: NORMAL %
HDLC SERPL-MCNC: 54 MG/DL
HGB BLD-MCNC: NORMAL G/DL
IMM GRANULOCYTES # BLD AUTO: NORMAL 10*3/UL
IMM GRANULOCYTES NFR BLD AUTO: NORMAL %
IMP & REVIEW OF LAB RESULTS: NORMAL
LDLC SERPL CALC-MCNC: 70 MG/DL (ref 0–99)
LYMPHOCYTES # BLD AUTO: NORMAL 10*3/UL
LYMPHOCYTES NFR BLD AUTO: NORMAL %
MCH RBC QN AUTO: NORMAL PG
MCHC RBC AUTO-ENTMCNC: NORMAL G/DL
MCV RBC AUTO: NORMAL FL
MONOCYTES # BLD AUTO: NORMAL 10*3/UL
MONOCYTES NFR BLD AUTO: NORMAL %
NEUTROPHILS # BLD AUTO: NORMAL 10*3/UL
NEUTROPHILS NFR BLD AUTO: NORMAL %
PLATELET # BLD AUTO: NORMAL 10*3/UL
PROT SERPL-MCNC: 7 G/DL (ref 6–8.5)
RBC # BLD AUTO: NORMAL 10*6/UL
SPECIMEN STATUS REPORT, ROLRST: NORMAL
TRIGL SERPL-MCNC: 91 MG/DL (ref 0–149)
VLDLC SERPL CALC-MCNC: 17 MG/DL (ref 5–40)
WBC # BLD AUTO: NORMAL 10*3/UL

## 2023-04-22 DIAGNOSIS — R93.1 AGATSTON CAC SCORE, >400: Primary | ICD-10-CM

## 2023-04-24 DIAGNOSIS — R93.1 AGATSTON CAC SCORE, >400: Primary | ICD-10-CM

## 2023-05-24 RX ORDER — ATORVASTATIN CALCIUM 20 MG/1
1 TABLET, FILM COATED ORAL NIGHTLY
COMMUNITY
Start: 2022-12-20

## 2023-05-24 RX ORDER — ASPIRIN 81 MG/1
81 TABLET, CHEWABLE ORAL DAILY
COMMUNITY
Start: 2022-12-20

## 2024-01-11 ENCOUNTER — TELEPHONE (OUTPATIENT)
Age: 59
End: 2024-01-11

## 2024-01-11 DIAGNOSIS — I25.10 ATHEROSCLEROTIC HEART DISEASE OF NATIVE CORONARY ARTERY WITHOUT ANGINA PECTORIS: Primary | ICD-10-CM

## 2024-01-11 RX ORDER — ATORVASTATIN CALCIUM 20 MG/1
20 TABLET, FILM COATED ORAL NIGHTLY
Qty: 90 TABLET | Refills: 3 | Status: SHIPPED | OUTPATIENT
Start: 2024-01-11

## 2024-01-11 NOTE — TELEPHONE ENCOUNTER
Per VO by MD.    Future Appointments   Date Time Provider Department Center   2/28/2024 10:40 AM Sidney Cintron MD [facility]

## 2024-01-11 NOTE — TELEPHONE ENCOUNTER
Pt is calling because he needs a refill on his atorvastatin 20 mg .Pharmacy listed    [address],   [place] [address] [phone] cell

## 2024-02-24 LAB
ALBUMIN SERPL-MCNC: 4.6 G/DL (ref 3.8–4.9)
ALP SERPL-CCNC: 64 IU/L (ref 44–121)
ALT SERPL-CCNC: 34 IU/L (ref 0–44)
AST SERPL-CCNC: 26 IU/L (ref 0–40)
BILIRUB DIRECT SERPL-MCNC: 0.36 MG/DL (ref 0–0.4)
BILIRUB SERPL-MCNC: 1.5 MG/DL (ref 0–1.2)
CHOLEST SERPL-MCNC: 135 MG/DL (ref 100–199)
HDLC SERPL-MCNC: 55 MG/DL
IMP & REVIEW OF LAB RESULTS: NORMAL
LDLC SERPL CALC-MCNC: 64 MG/DL (ref 0–99)
PROT SERPL-MCNC: 7 G/DL (ref 6–8.5)
TRIGL SERPL-MCNC: 82 MG/DL (ref 0–149)
VLDLC SERPL CALC-MCNC: 16 MG/DL (ref 5–40)

## 2024-02-28 ENCOUNTER — OFFICE VISIT (OUTPATIENT)
Age: 59
End: 2024-02-28
Payer: COMMERCIAL

## 2024-02-28 VITALS
BODY MASS INDEX: 24.52 KG/M2 | RESPIRATION RATE: 16 BRPM | SYSTOLIC BLOOD PRESSURE: 130 MMHG | HEART RATE: 64 BPM | WEIGHT: 185 LBS | OXYGEN SATURATION: 98 % | DIASTOLIC BLOOD PRESSURE: 80 MMHG | HEIGHT: 73 IN

## 2024-02-28 DIAGNOSIS — Z82.49 FAMILY HISTORY OF ISCHEMIC HEART DISEASE AND OTHER DISEASES OF THE CIRCULATORY SYSTEM: ICD-10-CM

## 2024-02-28 DIAGNOSIS — R93.1 AGATSTON CAC SCORE, >400: Primary | ICD-10-CM

## 2024-02-28 PROCEDURE — 93000 ELECTROCARDIOGRAM COMPLETE: CPT | Performed by: SPECIALIST

## 2024-02-28 PROCEDURE — 99213 OFFICE O/P EST LOW 20 MIN: CPT | Performed by: SPECIALIST

## 2024-03-12 NOTE — PROGRESS NOTES
unusual weight loss or fever; Neuro-no recent seizure or CVA ; GI- No BRBPR, abdom pain, bloating ; - no  hematuria   see supplement sheet, initialed and to be scanned by staff      Social Hx= reports that he has quit smoking. He has never used smokeless tobacco. He reports current alcohol use. He reports that he does not use drugs.   Family Hx- family history is not on file.   No Known Allergies     Exam and Labs:  /80   Pulse 64   Resp 16   Ht 1.854 m (6' 1\")   Wt 83.9 kg (185 lb)   SpO2 98%   BMI 24.41 kg/m²    @Constitutional:  NAD, comfortable  Head: NC,AT. Eyes: No scleral icterus. Neck:  Neck supple. No JVD present.Throat: moist mucous membranes.  Chest: Effort normal & normal respiratory excursion .Neurological: alert, conversant and oriented . Skin: Skin is not cold. No obvious systemic rash noted. Not diaphoretic. No erythema.   Psychiatric:  Grossly normal mood and affect.  Behavior appears normal.   Extremities:  no clubbing or cyanosis. Abdomen: non distended    Lungs:breath sounds normal. No stridor. distress, wheezes or  Rales.  Heart:    normal rate, regular rhythm, normal S1, S2, no murmurs, rubs, clicks or gallops , PMI non displaced.    Edema: Edema is none.          Current Outpatient Medications:     aspirin 81 MG chewable tablet, Take 1 tablet by mouth daily, Disp: , Rfl:     atorvastatin (LIPITOR) 20 MG tablet, Take 1 tablet by mouth nightly, Disp: 90 tablet, Rfl: 3   Impression see above.

## 2025-02-07 ENCOUNTER — TELEPHONE (OUTPATIENT)
Age: 60
End: 2025-02-07

## 2025-02-07 DIAGNOSIS — I25.10 ATHEROSCLEROTIC HEART DISEASE OF NATIVE CORONARY ARTERY WITHOUT ANGINA PECTORIS: ICD-10-CM

## 2025-02-07 RX ORDER — ATORVASTATIN CALCIUM 20 MG/1
20 TABLET, FILM COATED ORAL NIGHTLY
Qty: 90 TABLET | Refills: 3 | Status: SHIPPED | OUTPATIENT
Start: 2025-02-07

## 2025-02-07 NOTE — TELEPHONE ENCOUNTER
Per VO by MD.    Future Appointments   Date Time Provider Department Center   3/25/2025  1:40 PM Sidney Cintron MD [facility]

## 2025-02-07 NOTE — TELEPHONE ENCOUNTER
Patient is calling because he needs a refill on  Atorvastatin 20 mg.Patient is completely out of medication.      Pharmacy:  [id number] [place] [address] - P [phone] - F [phone] patient

## 2025-02-17 DIAGNOSIS — R93.1 AGATSTON CAC SCORE, >400: ICD-10-CM

## 2025-03-10 ENCOUNTER — TELEPHONE (OUTPATIENT)
Age: 60
End: 2025-03-10

## 2025-03-10 NOTE — TELEPHONE ENCOUNTER
Patient is calling in regards to see if he needs to stop any of his medications before he has surgery on 03/18/2025 for a hernia repair. He would like a call back.    Patient phone # [phone]

## 2025-03-12 NOTE — TELEPHONE ENCOUNTER
Sidney Cintron MD  You18 hours ago (2:45 PM)       Does not have to from our point of view but if the surgeon request then can stop for 7 days     Attempted to reach patient by telephone. A message was left for return call.

## 2025-03-25 ENCOUNTER — OFFICE VISIT (OUTPATIENT)
Age: 60
End: 2025-03-25
Payer: COMMERCIAL

## 2025-03-25 VITALS
HEIGHT: 73 IN | WEIGHT: 187 LBS | HEART RATE: 71 BPM | OXYGEN SATURATION: 97 % | BODY MASS INDEX: 24.78 KG/M2 | RESPIRATION RATE: 16 BRPM | SYSTOLIC BLOOD PRESSURE: 120 MMHG | DIASTOLIC BLOOD PRESSURE: 70 MMHG

## 2025-03-25 DIAGNOSIS — E78.00 HYPERCHOLESTEREMIA: ICD-10-CM

## 2025-03-25 DIAGNOSIS — Z82.49 FAMILY HISTORY OF EARLY CAD: ICD-10-CM

## 2025-03-25 DIAGNOSIS — R93.1 AGATSTON CAC SCORE, >400: Primary | ICD-10-CM

## 2025-03-25 PROCEDURE — 93000 ELECTROCARDIOGRAM COMPLETE: CPT | Performed by: SPECIALIST

## 2025-03-25 PROCEDURE — 99214 OFFICE O/P EST MOD 30 MIN: CPT | Performed by: SPECIALIST

## 2025-03-25 RX ORDER — TAMSULOSIN HYDROCHLORIDE 0.4 MG/1
CAPSULE ORAL
COMMUNITY
Start: 2025-02-10

## 2025-03-25 RX ORDER — OXYCODONE HYDROCHLORIDE 5 MG/1
TABLET ORAL
COMMUNITY
Start: 2025-03-18 | End: 2025-03-25

## 2025-03-25 ASSESSMENT — PATIENT HEALTH QUESTIONNAIRE - PHQ9
SUM OF ALL RESPONSES TO PHQ QUESTIONS 1-9: 0
1. LITTLE INTEREST OR PLEASURE IN DOING THINGS: NOT AT ALL
SUM OF ALL RESPONSES TO PHQ QUESTIONS 1-9: 0
2. FEELING DOWN, DEPRESSED OR HOPELESS: NOT AT ALL

## 2025-03-25 NOTE — PROGRESS NOTES
1. Have you been to the ER, urgent care clinic since your last visit?  Hospitalized since your last visit?no    2. Have you seen or consulted any other health care providers outside of the [facility] since your last visit?  Include any pap smears or colon screening. No

## 2025-03-25 NOTE — PATIENT INSTRUCTIONS
Have fasting labs completed. We will send results on [employer] and see you back for an annual follow up.     [facility]:    [facility] [place]   [address], [address]  [place], [place] [address]  Monday-Friday 7A-5P  [phone]    [place]  [address] [place], [place] [address]  Monday-Friday 7A-430P  [phone]    [facility]  [address]  [place], [place] [address]  Monday-Friday 730A-430P (closed 1230-130P)  [phone]    [address]  [place], [place] [address]  Monday-Friday 8A-5P

## 2025-03-25 NOTE — PROGRESS NOTES
Peter Laughlin     1965       Sidney Cintron MD, [facility]    PCP is Simin Rivera MD   Date of visit: 3/25/2025   [facility] [place]  [facility] [place]  HPI:  Peter Laughlin is a 59 y.o. male    annual follow-up visit    Today..  Doing well  Has no complaints  Went to [place]  and ate out a lot and had some pedal edema, resolved when returned  Denies chest pain,syncope or shortness of breath at rest   Has no tachycardia , palpitations or sense of arrythmia        CT cors with coronary Ca score at 659 at 90th percentile  family hx of cad.   Echo 2/7/2023 EF 56% mild LVH  Nuclear 2/16/2023 EF 71% walked 8 minutes 5 seconds normal perfusion treadmill study    EKG 3/25/2025 : NSR RSR V1 , WNL  Assessment/Plan:     Get labs including FLP soon  See back annual follow up     1.  Agatston symptom score greater than 400  High calcium score with normal stress test no chest pain  Goal LDL below 70  Continue Lipitor  Echo normal function  Labs prior to visit show LDL of 64  Recheck labs  Cath is any angina  Lab Results   Component Value Date    LDL 64 02/23/2024      Lab Results   Component Value Date    CHOL 135 02/23/2024    TRIG 82 02/23/2024    HDL 55 02/23/2024    VLDL 16 02/23/2024      2.  XOL  LDL  at goal , denies excess muscle aches or new liver issues  Lipid Lowering Medications       HMG CoA Reductase Inhibitors       atorvastatin (LIPITOR) 20 MG tablet Take 1 tablet by mouth nightly           Lab Results   Component Value Date    LDL 64 02/23/2024             3.Family history of CAD  Discussed CV risk factors       Impression:   1. Agatston CAC score, >400    2. Family history of early CAD        Cardiac History:   No specialty comments available.   PMHx-  Past Medical History:   Diagnosis Date    Family history of early CAD 3/20/2018     Future Appointments   Date Time Provider Department Center   3/31/2026  1:40 PM Sidney Cintron MD [facility]